# Patient Record
Sex: FEMALE | Race: WHITE | NOT HISPANIC OR LATINO | Employment: FULL TIME | URBAN - METROPOLITAN AREA
[De-identification: names, ages, dates, MRNs, and addresses within clinical notes are randomized per-mention and may not be internally consistent; named-entity substitution may affect disease eponyms.]

---

## 2020-08-31 ENCOUNTER — NURSE TRIAGE (OUTPATIENT)
Dept: OTHER | Facility: OTHER | Age: 60
End: 2020-08-31

## 2020-08-31 DIAGNOSIS — Z11.59 SPECIAL SCREENING EXAMINATION FOR VIRAL DISEASE: Primary | ICD-10-CM

## 2020-08-31 NOTE — TELEPHONE ENCOUNTER
Regarding: COVID-19 test traveling  ----- Message from Eleazar Fernandez sent at 8/31/2020 10:34 AM EDT -----  "I need to be tested due to traveling to [place] to see my mother and required a 72 hours negative result "

## 2020-08-31 NOTE — TELEPHONE ENCOUNTER
Reason for Disposition   [1] Living in area with major community spread within last 14 days AND [2] NO cough or fever or breathing difficulty    Answer Assessment - Initial Assessment Questions  1  CLOSE CONTACT: "Who is the person with the confirmed or suspected COVID-19 infection that you were exposed to?"      Unknown  2  PLACE of CONTACT: "Where were you when you were exposed to COVID-19?" (e g , home, school, medical waiting room; which city?)      Unknown  3  TYPE of CONTACT: "How much contact was there?" (e g , sitting next to, live in same house, work in same office, same building)      Unknown  4  DURATION of CONTACT: "How long were you in contact with the COVID-19 patient?" (e g , a few seconds, passed by person, a few minutes, live with the patient)      Unknown  5  DATE of CONTACT: "When did you have contact with a COVID-19 patient?" (e g , how many days ago)      Unknown  6  TRAVEL: "Have you traveled out of the country recently?" If so, "When and where?"      * Also ask about out-of-state travel, since the [facility] has identified some high risk cities for community spread in the [address] [place] [address],[address]  * Note: Travel becomes less relevant if there is widespread community transmission where the patient lives  Denies  7  COMMUNITY SPREAD: "Are there lots of cases of COVID-19 (community spread) where you live?" (See public health department website, if unsure)    * MAJOR community spread: high number of cases; numbers of cases are increasing; many people hospitalized  * MINOR community spread: low number of cases; not increasing; few or no people hospitalized      Major community  8  SYMPTOMS: "Do you have any symptoms?" (e g , fever, cough, breathing difficulty)      Denies symptoms  9  PREGNANCY OR POSTPARTUM: "Is there any chance you are pregnant?" "When was your last menstrual period?" "Did you deliver in the last 2 weeks?"      Denies symptoms  10  HIGH RISK: "Do you have any heart or lung problems?  Do you have a weak immune system?" (e g , CHF, COPD, asthma, HIV positive, chemotherapy, renal failure, diabetes mellitus, sickle cell anemia)        Healthy    Protocols used: CORONAVIRUS (COVID-19) - EXPOSURE-ADULT-AH

## 2021-06-06 ENCOUNTER — OFFICE VISIT (OUTPATIENT)
Dept: URGENT CARE | Facility: CLINIC | Age: 61
End: 2021-06-06
Payer: COMMERCIAL

## 2021-06-06 VITALS
WEIGHT: 155.2 LBS | BODY MASS INDEX: 24.36 KG/M2 | HEART RATE: 71 BPM | HEIGHT: 67 IN | RESPIRATION RATE: 16 BRPM | TEMPERATURE: 97.3 F | OXYGEN SATURATION: 98 %

## 2021-06-06 DIAGNOSIS — L30.9 PERIORBITAL DERMATITIS: Primary | ICD-10-CM

## 2021-06-06 PROCEDURE — 99213 OFFICE O/P EST LOW 20 MIN: CPT | Performed by: PHYSICIAN ASSISTANT

## 2021-06-06 RX ORDER — FLECAINIDE ACETATE 100 MG/1
TABLET ORAL
COMMUNITY
Start: 2021-06-05

## 2021-06-06 RX ORDER — DESONIDE 0.5 MG/G
CREAM TOPICAL 2 TIMES DAILY
COMMUNITY
End: 2021-06-06 | Stop reason: HOSPADM

## 2021-06-06 RX ORDER — DOXYCYCLINE HYCLATE 100 MG
100 TABLET ORAL 2 TIMES DAILY
Qty: 20 TABLET | Refills: 0 | Status: SHIPPED | OUTPATIENT
Start: 2021-06-06 | End: 2021-06-06 | Stop reason: SDUPTHER

## 2021-06-06 RX ORDER — DOXYCYCLINE HYCLATE 100 MG
100 TABLET ORAL 2 TIMES DAILY
Qty: 112 TABLET | Refills: 0 | Status: SHIPPED | OUTPATIENT
Start: 2021-06-06 | End: 2021-08-01

## 2021-06-06 RX ORDER — METRONIDAZOLE 10 MG/G
GEL TOPICAL DAILY
COMMUNITY

## 2021-06-06 RX ORDER — LORATADINE AND PSEUDOEPHEDRINE 10; 240 MG/1; MG/1
1 TABLET, EXTENDED RELEASE ORAL DAILY
COMMUNITY

## 2021-06-06 NOTE — PROGRESS NOTES
[facility]        NAME: Cecilia Raza is a 63 y o  female  : 1960    MRN: [id number]  DATE: 2021  TIME: 12:08 PM    Assessment and Plan   Periorbital dermatitis [L30 9]  1  Periorbital dermatitis  doxycycline hyclate (VIBRA-TABS) 100 mg tablet    DISCONTINUED: doxycycline hyclate (VIBRA-TABS) 100 mg tablet         Patient Instructions     Patient Instructions   Diagnosis discussed  Start doxycycline 100 mg twice daily for 6 weeks  Take with food but not dairy to avoid GI upset  Take with full glass of water and avoid laying down for 30 minutes after taking  Reviewed photosensitivity  Continue metronidazole gel but stop desonide  Keep follow up with dermatology      Follow up with PCP in 3-5 days  Proceed to  ER if symptoms worsen  Chief Complaint     Chief Complaint   Patient presents with    Rash     Onset 6 moths rash on face was prescribed two medications to apply and has not helped  Red Bumps have started to be larger and tender since  afternoon  History of Present Illness       62 y/o F presents c/o rash on face x 3 months  Comes and goes  Sometimes sore  PCP gave metronidazole gel to use in the morning and desonide to use at night without relief  Review of Systems   Review of Systems   Constitutional: Negative for chills and fever  Skin: Positive for rash           Current Medications       Current Outpatient Medications:     flecainide (TAMBOCOR) 100 mg tablet, , Disp: , Rfl:     loratadine-pseudoephedrine (CLARITIN-D 24-HOUR)  mg per 24 hr tablet, Take 1 tablet by mouth daily, Disp: , Rfl:     metroNIDAZOLE (METROGEL) 1 % gel, Apply topically daily, Disp: , Rfl:     doxycycline hyclate (VIBRA-TABS) 100 mg tablet, Take 1 tablet (100 mg total) by mouth 2 (two) times a day, Disp: 112 tablet, Rfl: 0    Current Allergies     Allergies as of 2021 - Reviewed 2021   Allergen Reaction Noted    Tetracycline Abdominal Pain 2021    Sulfa antibiotics Rash 06/06/2021            The following portions of the patient's history were reviewed and updated as appropriate: allergies, current medications, past family history, past medical history, past social history, past surgical history and problem list      History reviewed  No pertinent past medical history  History reviewed  No pertinent surgical history  No family history on file  Medications have been verified  Objective   Pulse 71   Temp (!) 97 3 °F (36 3 °C) (Tympanic)   Resp 16   Ht 5' 6 5" (1 689 m)   Wt 70 4 kg (155 lb 3 2 oz)   SpO2 98%   BMI 24 67 kg/m²   No LMP recorded  Physical Exam     Physical Exam  Constitutional:       Appearance: Normal appearance  She is normal weight  Skin:     Findings: Rash (erythematous inflammatory papules periorbital region  Perioral region and cheeks clear) present  Neurological:      Mental Status: She is alert

## 2021-06-06 NOTE — PATIENT INSTRUCTIONS
Diagnosis discussed  Start doxycycline 100 mg twice daily for 6 weeks  Take with food but not dairy to avoid GI upset  Take with full glass of water and avoid laying down for 30 minutes after taking   Reviewed photosensitivity  Continue metronidazole gel but stop desonide  Keep follow up with dermatology

## 2022-02-09 ENCOUNTER — OFFICE VISIT (OUTPATIENT)
Dept: URGENT CARE | Facility: CLINIC | Age: 62
End: 2022-02-09
Payer: COMMERCIAL

## 2022-02-09 VITALS
BODY MASS INDEX: 24.11 KG/M2 | HEART RATE: 68 BPM | WEIGHT: 150 LBS | HEIGHT: 66 IN | OXYGEN SATURATION: 97 % | TEMPERATURE: 97.8 F | RESPIRATION RATE: 18 BRPM

## 2022-02-09 DIAGNOSIS — R05.1 ACUTE COUGH: Primary | ICD-10-CM

## 2022-02-09 PROCEDURE — 99213 OFFICE O/P EST LOW 20 MIN: CPT | Performed by: PHYSICIAN ASSISTANT

## 2022-02-09 PROCEDURE — U0005 INFEC AGEN DETEC AMPLI PROBE: HCPCS | Performed by: PHYSICIAN ASSISTANT

## 2022-02-09 PROCEDURE — U0003 INFECTIOUS AGENT DETECTION BY NUCLEIC ACID (DNA OR RNA); SEVERE ACUTE RESPIRATORY SYNDROME CORONAVIRUS 2 (SARS-COV-2) (CORONAVIRUS DISEASE [COVID-19]), AMPLIFIED PROBE TECHNIQUE, MAKING USE OF HIGH THROUGHPUT TECHNOLOGIES AS DESCRIBED BY CMS-2020-01-R: HCPCS | Performed by: PHYSICIAN ASSISTANT

## 2022-02-09 RX ORDER — TEMAZEPAM 30 MG/1
CAPSULE ORAL
COMMUNITY
Start: 2022-02-07

## 2022-02-09 RX ORDER — BENZONATATE 200 MG/1
200 CAPSULE ORAL 3 TIMES DAILY PRN
Qty: 20 CAPSULE | Refills: 0 | Status: SHIPPED | OUTPATIENT
Start: 2022-02-09

## 2022-02-09 RX ORDER — PIMECROLIMUS 10 MG/G
CREAM TOPICAL
COMMUNITY
Start: 2022-02-03

## 2022-02-09 RX ORDER — TRAZODONE HYDROCHLORIDE 50 MG/1
TABLET ORAL
COMMUNITY
Start: 2022-02-07

## 2022-02-09 RX ORDER — LORAZEPAM 1 MG/1
TABLET ORAL
COMMUNITY
Start: 2021-12-14

## 2022-02-09 NOTE — PROGRESS NOTES
[facility]        NAME: Sabrina Ordonez is a 63 y o  female  : 1960    MRN: [id number]  DATE: 2022  TIME: 9:56 AM    Assessment and Plan   Acute cough [R05 1]  1  Acute cough  COVID Only - Collected at [place] [address] or [facility]    COVID Only - Collected at [place] [address] or [facility]    benzonatate (TESSALON) 200 MG capsule         Patient Instructions     Swabbed for COVID-19, discussed self quarantine until contacted with results  Monitor for worsening symptoms  C/w OTC symptom relief including tylenol, fluids, rest  Recommend supplementing with vitamins D & C as well as a multivitamin    Follow up with PCP in 3-5 days  Proceed to  ER if symptoms worsen  Chief Complaint     Chief Complaint   Patient presents with    Cough     started with cough, sore throat, chest congestion on , cough is getting worse, pt is NOT distress, no fevers, otc nasal spray and allergy meds  no sick contacts, rapid home test this am, which was negative, pt is FULLY vaccinated for covid and flu          History of Present Illness       Patient presents for COVID-19 testing  Pt reports symptoms of cough, headache, sore throat, and congestion  Pt denies fever, chills, sweats, chest tightness, dyspnea, SOB, abdominal symptoms  Pt denies history of underlying medical problems such as asthma & smoking  Pt reports taking OTC symptom relief without significant improvement  Pt denies recent known COVID exposure  She reports that she is vaccinated against COVID and had a negative home test       Review of Systems   Review of Systems   Constitutional: Negative for chills and fever  HENT: Positive for congestion and sore throat  Negative for ear pain  Eyes: Negative for pain and visual disturbance  Respiratory: Positive for cough  Negative for chest tightness and shortness of breath  Cardiovascular: Negative for chest pain and palpitations     Gastrointestinal: Negative for abdominal pain, diarrhea and vomiting  Genitourinary: Negative for dysuria and hematuria  Musculoskeletal: Negative for arthralgias, back pain and myalgias  Skin: Negative for color change and rash  Neurological: Positive for headaches  Negative for seizures and syncope  All other systems reviewed and are negative  Current Medications       Current Outpatient Medications:     flecainide (TAMBOCOR) 100 mg tablet, , Disp: , Rfl:     loratadine-pseudoephedrine (CLARITIN-D 24-HOUR)  mg per 24 hr tablet, Take 1 tablet by mouth daily, Disp: , Rfl:     metroNIDAZOLE (METROGEL) 1 % gel, Apply topically daily, Disp: , Rfl:     temazepam (RESTORIL) 30 mg capsule, , Disp: , Rfl:     traZODone (DESYREL) 50 mg tablet, , Disp: , Rfl:     benzonatate (TESSALON) 200 MG capsule, Take 1 capsule (200 mg total) by mouth 3 (three) times a day as needed for cough, Disp: 20 capsule, Rfl: 0    LORazepam (ATIVAN) 1 mg tablet, , Disp: , Rfl:     pimecrolimus (ELIDEL) 1 % cream, , Disp: , Rfl:     Current Allergies     Allergies as of 02/09/2022 - Reviewed 02/09/2022   Allergen Reaction Noted    Oxycodone-acetaminophen Other (See Comments) 03/20/2015    Tetracycline Abdominal Pain 06/06/2021    Other Rash 01/30/2014    Sulfa antibiotics Rash 06/06/2021            The following portions of the patient's history were reviewed and updated as appropriate: allergies, current medications, past family history, past medical history, past social history, past surgical history and problem list      History reviewed  No pertinent past medical history  History reviewed  No pertinent surgical history  No family history on file  Medications have been verified  Objective   Pulse 68   Temp 97 8 °F (36 6 °C) (Tympanic)   Resp 18   Ht 5' 6" (1 676 m)   Wt 68 kg (150 lb)   SpO2 97%   BMI 24 21 kg/m²   No LMP recorded  Physical Exam     Physical Exam  Vitals and nursing note reviewed     Constitutional:       General: She is not in acute distress  Appearance: Normal appearance  She is well-developed  She is not ill-appearing or diaphoretic  HENT:      Head: Normocephalic and atraumatic  Right Ear: Tympanic membrane, ear canal and external ear normal       Left Ear: Tympanic membrane, ear canal and external ear normal       Nose: Congestion present  Mouth/Throat:      Mouth: Mucous membranes are moist       Pharynx: Oropharynx is clear  No oropharyngeal exudate or posterior oropharyngeal erythema  Eyes:      Conjunctiva/sclera: Conjunctivae normal       Pupils: Pupils are equal, round, and reactive to light  Cardiovascular:      Rate and Rhythm: Normal rate and regular rhythm  Heart sounds: Normal heart sounds  Pulmonary:      Effort: Pulmonary effort is normal  No respiratory distress  Breath sounds: Normal breath sounds  No stridor  No wheezing, rhonchi or rales  Musculoskeletal:      Cervical back: Normal range of motion and neck supple  Lymphadenopathy:      Cervical: No cervical adenopathy  Skin:     General: Skin is warm and dry  Capillary Refill: Capillary refill takes less than 2 seconds  Findings: No rash  Neurological:      Mental Status: She is alert and oriented to person, place, and time  Cranial Nerves: No cranial nerve deficit  Sensory: No sensory deficit  Psychiatric:         Behavior: Behavior normal          Thought Content:  Thought content normal

## 2022-02-10 LAB — SARS-COV-2 RNA RESP QL NAA+PROBE: NEGATIVE

## 2022-08-12 ENCOUNTER — APPOINTMENT (OUTPATIENT)
Dept: URGENT CARE | Facility: CLINIC | Age: 62
End: 2022-08-12

## 2022-08-12 ENCOUNTER — APPOINTMENT (OUTPATIENT)
Dept: LAB | Facility: CLINIC | Age: 62
End: 2022-08-12

## 2022-08-12 DIAGNOSIS — Z02.1 PRE-EMPLOYMENT HEALTH SCREENING EXAMINATION: Primary | ICD-10-CM

## 2022-08-12 LAB — HBV SURFACE AB SER-ACNC: <3.1 MIU/ML

## 2022-08-12 PROCEDURE — 86706 HEP B SURFACE ANTIBODY: CPT | Performed by: PHYSICIAN ASSISTANT

## 2022-08-12 PROCEDURE — 36415 COLL VENOUS BLD VENIPUNCTURE: CPT

## 2023-06-19 ENCOUNTER — OFFICE VISIT (OUTPATIENT)
Dept: URGENT CARE | Facility: CLINIC | Age: 63
End: 2023-06-19
Payer: COMMERCIAL

## 2023-06-19 VITALS
TEMPERATURE: 98.7 F | OXYGEN SATURATION: 98 % | SYSTOLIC BLOOD PRESSURE: 132 MMHG | BODY MASS INDEX: 26.21 KG/M2 | HEART RATE: 88 BPM | WEIGHT: 167 LBS | HEIGHT: 67 IN | DIASTOLIC BLOOD PRESSURE: 78 MMHG

## 2023-06-19 DIAGNOSIS — L23.7 POISON IVY: Primary | ICD-10-CM

## 2023-06-19 PROCEDURE — 99213 OFFICE O/P EST LOW 20 MIN: CPT | Performed by: PREVENTIVE MEDICINE

## 2023-06-19 RX ORDER — PREDNISONE 10 MG/1
TABLET ORAL
Qty: 25 TABLET | Refills: 0 | Status: SHIPPED | OUTPATIENT
Start: 2023-06-19

## 2023-06-20 NOTE — PROGRESS NOTES
[facility]        NAME: Nahomy Yates is a 66 y o  female  : 1960    MRN: [id number]  DATE: 2023  TIME: 8:30 PM    Assessment and Plan   Poison ivy [L23 7]  1  Poison ivy  predniSONE 10 mg tablet            Patient Instructions       Follow up with PCP in 3-5 days  Proceed to  ER if symptoms worsen  Chief Complaint     Chief Complaint   Patient presents with   • Poison Ivy     Patient presents with poison that began on Saturday afternoon and reports it to be spreading using Iverest without relief of symptoms         History of Present Illness       Rash on extremities after exposure to poison ivy      Review of Systems   Review of Systems   Skin: Positive for rash           Current Medications       Current Outpatient Medications:   •  loratadine-pseudoephedrine (CLARITIN-D 24-HOUR)  mg per 24 hr tablet, Take 1 tablet by mouth daily, Disp: , Rfl:   •  predniSONE 10 mg tablet, 5 tablets a day for 5 days, Disp: 25 tablet, Rfl: 0  •  temazepam (RESTORIL) 30 mg capsule, , Disp: , Rfl:   •  traZODone (DESYREL) 50 mg tablet, , Disp: , Rfl:   •  benzonatate (TESSALON) 200 MG capsule, Take 1 capsule (200 mg total) by mouth 3 (three) times a day as needed for cough (Patient not taking: Reported on 2023), Disp: 20 capsule, Rfl: 0  •  flecainide (TAMBOCOR) 100 mg tablet, , Disp: , Rfl:   •  LORazepam (ATIVAN) 1 mg tablet, , Disp: , Rfl:   •  metroNIDAZOLE (METROGEL) 1 % gel, Apply topically daily (Patient not taking: Reported on 2023), Disp: , Rfl:   •  pimecrolimus (ELIDEL) 1 % cream, , Disp: , Rfl:     Current Allergies     Allergies as of 2023 - Reviewed 2023   Allergen Reaction Noted   • Oxycodone-acetaminophen Other (See Comments) 2015   • Tetracycline Abdominal Pain 2021   • Other Rash 2014   • Sulfa antibiotics Rash 2021            The following portions of the patient's history were reviewed and updated as appropriate: allergies, current "medications, past family history, past medical history, past social history, past surgical history and problem list      History reviewed  No pertinent past medical history  No past surgical history on file  No family history on file  Medications have been verified  Objective   /78   Pulse 88   Temp 98 7 °F (37 1 °C) (Tympanic)   Ht 5' 7\" (1 702 m)   Wt 75 8 kg (167 lb)   SpO2 98%   BMI 26 16 kg/m²   No LMP recorded         Physical Exam     Physical Exam  Skin:     Comments: Papular and linear erythematous rash with some scabs and weeping on extremities                   "

## 2023-12-09 ENCOUNTER — OFFICE VISIT (OUTPATIENT)
Dept: URGENT CARE | Facility: CLINIC | Age: 63
End: 2023-12-09
Payer: COMMERCIAL

## 2023-12-09 VITALS
WEIGHT: 167 LBS | OXYGEN SATURATION: 96 % | BODY MASS INDEX: 26.21 KG/M2 | HEART RATE: 60 BPM | RESPIRATION RATE: 16 BRPM | TEMPERATURE: 97.2 F | HEIGHT: 67 IN

## 2023-12-09 DIAGNOSIS — S20.212A CONTUSION OF RIB ON LEFT SIDE, INITIAL ENCOUNTER: Primary | ICD-10-CM

## 2023-12-09 PROCEDURE — 99213 OFFICE O/P EST LOW 20 MIN: CPT | Performed by: PREVENTIVE MEDICINE

## 2023-12-09 NOTE — PATIENT INSTRUCTIONS
You have either broken a rib or contused the rib the symptoms of the same. Ice to the area as often as possible. Activity as tolerated. Ibuprofen for pain.

## 2023-12-09 NOTE — PROGRESS NOTES
[facility]        NAME: Juliette Yen is a 66 y.o. female  : 1960    MRN: [id number]  DATE: 2023  TIME: 12:55 PM    Assessment and Plan   Contusion of rib on left side, initial encounter [S20.212A]  1. Contusion of rib on left side, initial encounter              Patient Instructions       Follow up with PCP in 3-5 days. Proceed to  ER if symptoms worsen. Chief Complaint     Chief Complaint   Patient presents with    Back Injury     Pt reports left sided lumbar back pain with radiation up into left scapula that occurred last night after falling against her bathtub. Denies hitting head. Denies any loc. Managing with Motrin without relief. History of Present Illness       Last night she fell hitting the left sided rib cage on the bathroom tub. She now has pain over the ribs when she coughs laughs or sneezes        Review of Systems   Review of Systems   Musculoskeletal:  Positive for arthralgias.          Current Medications       Current Outpatient Medications:     flecainide (TAMBOCOR) 100 mg tablet, , Disp: , Rfl:     traZODone (DESYREL) 50 mg tablet, , Disp: , Rfl:     benzonatate (TESSALON) 200 MG capsule, Take 1 capsule (200 mg total) by mouth 3 (three) times a day as needed for cough (Patient not taking: Reported on 2023), Disp: 20 capsule, Rfl: 0    loratadine-pseudoephedrine (CLARITIN-D 24-HOUR)  mg per 24 hr tablet, Take 1 tablet by mouth daily (Patient not taking: Reported on 2023), Disp: , Rfl:     LORazepam (ATIVAN) 1 mg tablet, , Disp: , Rfl:     metroNIDAZOLE (METROGEL) 1 % gel, Apply topically daily (Patient not taking: Reported on 2023), Disp: , Rfl:     pimecrolimus (ELIDEL) 1 % cream, , Disp: , Rfl:     predniSONE 10 mg tablet, 5 tablets a day for 5 days (Patient not taking: Reported on 2023), Disp: 25 tablet, Rfl: 0    temazepam (RESTORIL) 30 mg capsule, , Disp: , Rfl:     Current Allergies     Allergies as of 2023 - Reviewed 12/09/2023   Allergen Reaction Noted    Oxycodone-acetaminophen Other (See Comments) 03/20/2015    Tetracycline Abdominal Pain 06/06/2021    Other Rash 01/30/2014    Sulfa antibiotics Rash 06/06/2021            The following portions of the patient's history were reviewed and updated as appropriate: allergies, current medications, past family history, past medical history, past social history, past surgical history and problem list.     No past medical history on file. No past surgical history on file. No family history on file. Medications have been verified. Objective   Pulse 60   Temp (!) 97.2 °F (36.2 °C)   Resp 16   Ht 5' 7" (1.702 m)   Wt 75.8 kg (167 lb)   SpO2 96%   BMI 26.16 kg/m²   No LMP recorded. Physical Exam     Physical Exam  Musculoskeletal:      Comments: Pain on pressing ribs 11 and 12 laterally and around to the front.

## 2024-08-07 ENCOUNTER — APPOINTMENT (OUTPATIENT)
Dept: URGENT CARE | Facility: CLINIC | Age: 64
End: 2024-08-07

## 2024-10-03 DIAGNOSIS — Z00.6 ENCOUNTER FOR EXAMINATION FOR NORMAL COMPARISON OR CONTROL IN CLINICAL RESEARCH PROGRAM: ICD-10-CM

## 2024-11-12 ENCOUNTER — APPOINTMENT (OUTPATIENT)
Dept: LAB | Facility: CLINIC | Age: 64
End: 2024-11-12

## 2024-11-12 DIAGNOSIS — Z00.6 ENCOUNTER FOR EXAMINATION FOR NORMAL COMPARISON OR CONTROL IN CLINICAL RESEARCH PROGRAM: ICD-10-CM

## 2024-11-12 PROCEDURE — 36415 COLL VENOUS BLD VENIPUNCTURE: CPT

## 2024-11-23 LAB
APOB+LDLR+PCSK9 GENE MUT ANL BLD/T: NOT DETECTED
BRCA1+BRCA2 DEL+DUP + FULL MUT ANL BLD/T: NOT DETECTED
MLH1+MSH2+MSH6+PMS2 GN DEL+DUP+FUL M: NOT DETECTED

## 2025-05-21 ENCOUNTER — OFFICE VISIT (OUTPATIENT)
Dept: URGENT CARE | Facility: CLINIC | Age: 65
End: 2025-05-21
Payer: COMMERCIAL

## 2025-05-21 VITALS
RESPIRATION RATE: 18 BRPM | TEMPERATURE: 97.5 F | SYSTOLIC BLOOD PRESSURE: 121 MMHG | HEART RATE: 64 BPM | DIASTOLIC BLOOD PRESSURE: 86 MMHG | OXYGEN SATURATION: 98 %

## 2025-05-21 DIAGNOSIS — H57.9 ITCHY EYES: Primary | ICD-10-CM

## 2025-05-21 PROCEDURE — 99213 OFFICE O/P EST LOW 20 MIN: CPT

## 2025-05-21 RX ORDER — DOXYCYCLINE HYCLATE 50 MG/1
50 CAPSULE ORAL 2 TIMES DAILY
COMMUNITY
Start: 2025-05-14

## 2025-05-21 RX ORDER — KETOTIFEN FUMARATE 0.35 MG/ML
1 SOLUTION/ DROPS OPHTHALMIC 2 TIMES DAILY
Qty: 5 ML | Refills: 0 | Status: SHIPPED | OUTPATIENT
Start: 2025-05-21

## 2025-05-21 NOTE — PROGRESS NOTES
"  [facility]        NAME: Jabari Buchanan is a 65 y.o. female  : 1960    MRN: [id number]  DATE: May 21, 2025  TIME: 7:17 PM    Assessment and Plan   Itchy eyes [H57.9]  1. Itchy eyes  Ketotifen Fumarate (ZADITOR) 0.035 % ophthalmic solution            Patient Instructions   Use the eyedrops as directed    Follow-up with the dermatologist tomorrow.    Follow up with PCP in 3-5 days.  Proceed to  ER if symptoms worsen.    If tests have been performed at [facility], our office will contact you with results if changes need to be made to the care plan discussed with you at the visit.  You can review your full results on [facility].    Chief Complaint     Chief Complaint   Patient presents with    Rash     Pt has a red bumpy itchy rash on her face and near her eyes since about \"Thursday or Friday\". Has been using eye drops and allergy meds without any relief          History of Present Illness       This is a 67-year-old female who presents today with itching on her eyelids and itching on the outer canthus and inner canthus of the eye.  She has a dry patch above her  left eyebrow.  She also states that she has a rash below her nose on both sides above her mouth.  She has been seen by dermatology at [facility] multiple times.  She was diagnosed with rosacea on her face.  She has tried on 3/10/2025 desonide which did not help, she was also given which   Tacrolimus with no relief..  She was seen again on  and given Trianchicjlolene for 7 days which did not help. She was seen on  and prescribed Sara, chlobetasol, azelaic acid and started on  doxycycline.  She states that she was unable to  this sorry for the other cream because it was not covered by her insurance and needed preauthorization.  She states that the eye itching just started.  But it is documented in her notes that she has had it in the past.  We did discuss using Pataday eyedrops to help with the itching.  And " to follow-up with dermatologist tomorrow    Rash        Review of Systems   Review of Systems   Constitutional: Negative.    HENT: Negative.     Eyes:  Positive for itching. Negative for photophobia, pain, redness and visual disturbance.   Respiratory: Negative.     Cardiovascular: Negative.    Gastrointestinal: Negative.    Genitourinary: Negative.    Musculoskeletal: Negative.    Skin:  Positive for rash.   Neurological: Negative.          Current Medications     Current Medications[1]    Current Allergies     Allergies as of 05/21/2025 - Reviewed 05/21/2025   Allergen Reaction Noted    Oxycodone-acetaminophen Other (See Comments) 03/20/2015    Tetracycline Abdominal Pain 06/06/2021    Other Rash 01/30/2014    Sulfa antibiotics Rash 06/06/2021            The following portions of the patient's history were reviewed and updated as appropriate: allergies, current medications, past family history, past medical history, past social history, past surgical history and problem list.     Past Medical History[2]    Past Surgical History[3]    Family History[4]      Medications have been verified.        Objective   /86   Pulse 64   Temp 97.5 °F (36.4 °C)   Resp 18   SpO2 98%   No LMP recorded.       Physical Exam     Physical Exam  Vitals reviewed.   Constitutional:       General: She is not in acute distress.     Appearance: Normal appearance. She is not ill-appearing.   HENT:      Head: Normocephalic and atraumatic.        Comments: Complains of dry patch above left eyebrow  Patient has rash on both sides of her nose between her nose and her mouth.  Patient complains of itching on her eyelids and in the corners of her eyes.    Conjunctiva is white no discharge from the eyes.        Eyes:      Extraocular Movements: Extraocular movements intact.      Conjunctiva/sclera: Conjunctivae normal.     Pulmonary:      Effort: Pulmonary effort is normal.     Skin:     General: Skin is warm.     Neurological:       General: No focal deficit present.      Mental Status: She is alert.     Psychiatric:         Mood and Affect: Mood normal.         Behavior: Behavior normal.         Judgment: Judgment normal.                        [1]   Current Outpatient Medications:     doxycycline hyclate (VIBRAMYCIN) 50 mg capsule, Take 50 mg by mouth 2 (two) times a day, Disp: , Rfl:     Ketotifen Fumarate (ZADITOR) 0.035 % ophthalmic solution, Administer 1 drop to both eyes 2 (two) times a day, Disp: 5 mL, Rfl: 0    benzonatate (TESSALON) 200 MG capsule, Take 1 capsule (200 mg total) by mouth 3 (three) times a day as needed for cough (Patient not taking: Reported on 5/21/2025), Disp: 20 capsule, Rfl: 0    flecainide (TAMBOCOR) 100 mg tablet, , Disp: , Rfl:     loratadine-pseudoephedrine (CLARITIN-D 24-HOUR)  mg per 24 hr tablet, Take 1 tablet by mouth daily (Patient not taking: Reported on 5/21/2025), Disp: , Rfl:     LORazepam (ATIVAN) 1 mg tablet, , Disp: , Rfl:     metroNIDAZOLE (METROGEL) 1 % gel, Apply topically daily (Patient not taking: Reported on 5/21/2025), Disp: , Rfl:     pimecrolimus (ELIDEL) 1 % cream, , Disp: , Rfl:     predniSONE 10 mg tablet, 5 tablets a day for 5 days (Patient not taking: Reported on 5/21/2025), Disp: 25 tablet, Rfl: 0    temazepam (RESTORIL) 30 mg capsule, , Disp: , Rfl:     traZODone (DESYREL) 50 mg tablet, , Disp: , Rfl:   [2] No past medical history on file.  [3] No past surgical history on file.  [4] No family history on file.